# Patient Record
Sex: FEMALE | Race: WHITE | URBAN - METROPOLITAN AREA
[De-identification: names, ages, dates, MRNs, and addresses within clinical notes are randomized per-mention and may not be internally consistent; named-entity substitution may affect disease eponyms.]

---

## 2019-03-04 ENCOUNTER — TELEPHONE (OUTPATIENT)
Dept: FAMILY MEDICINE CLINIC | Age: 34
End: 2019-03-04

## 2019-03-04 NOTE — TELEPHONE ENCOUNTER
Patient called the office to request a refund of her deposit for an immigration physical. Patient was advised a message will be sent to the manager. Patient verbalized understanding and had no further questions.

## 2019-03-05 ENCOUNTER — OFFICE VISIT (OUTPATIENT)
Dept: INTERNAL MEDICINE CLINIC | Age: 34
End: 2019-03-05

## 2019-03-05 VITALS
HEIGHT: 63 IN | RESPIRATION RATE: 18 BRPM | OXYGEN SATURATION: 98 % | DIASTOLIC BLOOD PRESSURE: 86 MMHG | HEART RATE: 67 BPM | BODY MASS INDEX: 23.57 KG/M2 | TEMPERATURE: 97.5 F | SYSTOLIC BLOOD PRESSURE: 125 MMHG | WEIGHT: 133 LBS

## 2019-03-05 DIAGNOSIS — Z02.89 HISTORY AND PHYSICAL EXAMINATION, IMMIGRATION: Primary | ICD-10-CM

## 2019-03-05 RX ORDER — BISMUTH SUBSALICYLATE 262 MG
1 TABLET,CHEWABLE ORAL DAILY
COMMUNITY

## 2019-03-05 NOTE — PROGRESS NOTES
Immigration Physical  History:   Moraima Mancia is a 29 y.o. female presenting today for an initial visit for immigration physical.      1.  TB history/symptoms:    Patient denies history of active TB and denies exposure to anyone with active TB   History positive TST (PPD) or positive quantiferon?  no    2. Syphilis/gonorrhea/STD exposure/history/symptoms:  Patient denies exposure to STDs including syphilis and gonorrhea. 3.  Leprosy exposure/history/symptoms:  Patient denies anesthetic, hypo or hyperpigmented skin patches, sensory loss in fingers and toes, painless wounds. she also denies history of or exposure to leprosy as well as family history of skin lesions or leprosy. 4.  Mental disorders:  Patient denies depression, bipolar disorder, or schizophrenia. she denies suicidal or homicidal ideations or other harmful behavior. 5.  Substance use disorders:  Patient denies current or recent illicit use of controlled substances and street drugs. she denies use of alcohol that is causing harmful behavior. 6.  Vaccine history:  Patient has brought vaccine record? Yes. Patient has history of chickenpox or has had the chickenpox vaccine? Has varicella hx    Review of Systems   Constitutional: Negative. Negative for chills, fever, malaise/fatigue and weight loss. HENT: Negative. Eyes: Negative. Respiratory: Negative. Negative for cough, hemoptysis, sputum production and shortness of breath. Cardiovascular: Negative. Negative for chest pain. Gastrointestinal: Negative. Negative for abdominal pain. Genitourinary: Negative. Negative for hematuria. Denies genital sores, ulcers, discharge   Skin: Negative for rash. Neurological: Negative. Psychiatric/Behavioral: Negative. Negative for depression, hallucinations, substance abuse and suicidal ideas.         Denies h/o schizophrenia, bipolar d/o       Past Medical History:   Diagnosis Date    Arrhythmia        Past Surgical History:   Procedure Laterality Date    HX GYN         Social History     Socioeconomic History    Marital status:      Spouse name: Not on file    Number of children: Not on file    Years of education: Not on file    Highest education level: Not on file   Social Needs    Financial resource strain: Not on file    Food insecurity - worry: Not on file    Food insecurity - inability: Not on file    Transportation needs - medical: Not on file   [employer] needs - non-medical: Not on file   Occupational History    Not on file   Tobacco Use    Smoking status: Former Smoker    Smokeless tobacco: Never Used   Substance and Sexual Activity    Alcohol use: Not on file    Drug use: No    Sexual activity: Yes     Partners: Male     Birth control/protection: None   Other Topics Concern    Not on file   Social History Narrative    Not on file       Family History   Problem Relation Age of Onset    No Known Problems Mother     No Known Problems Father        Current Outpatient Medications on File Prior to Visit   Medication Sig Dispense Refill    multivitamin (ONE A DAY) tablet Take 1 Tab by mouth daily. No current facility-administered medications on file prior to visit. No Known Allergies    Objective:   VS:    Visit Vitals  /86 (BP 1 Location: Left arm, BP Patient Position: Sitting)   Pulse 67   Temp 97.5 °F (36.4 °C) (Oral)   Resp 18   Ht 5' 2.5\" (1.588 m)   Wt 133 lb (60.3 kg)   SpO2 98%   BMI 23.94 kg/m²     Physical Exam   Constitutional: She is oriented to person, place, and time. She appears well-developed and well-nourished. No distress. HENT:   Head: Normocephalic and atraumatic. Right Ear: External ear normal.   Left Ear: External ear normal.   Nose: Nose normal.   Mouth/Throat: Oropharynx is clear and moist.   Eyes: EOM are normal. Pupils are equal, round, and reactive to light. Neck: Normal range of motion. Neck supple. Carotid bruit is not present.  No tracheal deviation present. Cardiovascular: Normal rate, regular rhythm, normal heart sounds and intact distal pulses. Exam reveals no gallop and no friction rub. No murmur heard. Pulmonary/Chest: Effort normal and breath sounds normal. She has no wheezes. She has no rales. Abdominal: Soft. Bowel sounds are normal. She exhibits no distension. There is no tenderness. Musculoskeletal: She exhibits no edema or tenderness. Lymphadenopathy:     She has no cervical adenopathy. Neurological: She is alert and oriented to person, place, and time. Skin: Skin is warm and dry. Psychiatric: She has a normal mood and affect. Her behavior is normal.   Nursing note and vitals reviewed. Assessment/ Plan:     Diagnoses and all orders for this visit:    1. History and physical examination, immigration  -     QUANTIFERON-TB GOLD PLUS; Future  -     RPR; Future    She had neg GC ROBERT in Jan so not repeating that. Got a copy of result. She needs MMR booster and flu shot. Will bring back next week    I have discussed the diagnosis with the patient and the intended plan as seen in the above orders. The patient verbalized understanding and agrees with the plan. Follow-up Disposition:  Return for we will call you when lab results are back.     Rachel Mao MD

## 2019-03-05 NOTE — PROGRESS NOTES
Chief Complaint   Patient presents with    Physical     Immigration     1. Have you been to the ER, urgent care clinic since your last visit? Hospitalized since your last visit? No    2. Have you seen or consulted any other health care providers outside of the [address] [place] [address] since your last visit? Include any pap smears or colon screening.  No   3 most recent PHQ Screens 3/5/2019   Little interest or pleasure in doing things Not at all   Feeling down, depressed, irritable, or hopeless Not at all   Total Score PHQ 2 0

## 2019-03-09 LAB
GAMMA INTERFERON BACKGROUND BLD IA-ACNC: 0.04 IU/ML
M TB IFN-G BLD-IMP: NEGATIVE
M TB IFN-G CD4+ BCKGRND COR BLD-ACNC: 0.03 IU/ML
MITOGEN IGNF BLD-ACNC: >10 IU/ML
QUANTIFERON INCUBATION, QF1T: NORMAL
QUANTIFERON TB2 AG: 0.03 IU/ML
RPR SER QL: NON REACTIVE
SERVICE CMNT-IMP: NORMAL

## 2019-05-21 ENCOUNTER — OFFICE VISIT (OUTPATIENT)
Dept: INTERNAL MEDICINE CLINIC | Age: 34
End: 2019-05-21

## 2019-05-21 VITALS
OXYGEN SATURATION: 97 % | RESPIRATION RATE: 12 BRPM | HEIGHT: 63 IN | HEART RATE: 75 BPM | DIASTOLIC BLOOD PRESSURE: 78 MMHG | SYSTOLIC BLOOD PRESSURE: 113 MMHG | BODY MASS INDEX: 22.32 KG/M2 | TEMPERATURE: 98 F | WEIGHT: 126 LBS

## 2019-05-21 DIAGNOSIS — Z02.89 HISTORY AND PHYSICAL EXAMINATION, IMMIGRATION: Primary | ICD-10-CM

## 2019-05-21 NOTE — PROGRESS NOTES
Immigration Physical  History:   Fozia Gamez is a 29 y.o. female presenting today for an initial visit for immigration physical.      1.  TB history/symptoms:    Patient denies history of active TB and denies exposure to anyone with active TB   History positive TST (PPD) or positive quantiferon? No     2. Syphilis/gonorrhea/STD exposure/history/symptoms:  Patient denies exposure to STDs including syphilis and gonorrhea. 3.  Leprosy exposure/history/symptoms:  Patient denies anesthetic, hypo or hyperpigmented skin patches, sensory loss in fingers and toes, painless wounds. she also denies history of or exposure to leprosy as well as family history of skin lesions or leprosy. 4.  Mental disorders:  Patient denies depression, bipolar disorder, or schizophrenia. she denies suicidal or homicidal ideations or other harmful behavior. 5.  Substance use disorders:  Patient denies current or recent illicit use of controlled substances and street drugs. she denies use of alcohol that is causing harmful behavior. 6.  Vaccine history:  Patient has brought vaccine record? Patient has history of chickenpox or has had the chickenpox vaccine?     ROS    Past Medical History:   Diagnosis Date    Arrhythmia        Past Surgical History:   Procedure Laterality Date    HX GYN         Social History     Socioeconomic History    Marital status:      Spouse name: Not on file    Number of children: Not on file    Years of education: Not on file    Highest education level: Not on file   Occupational History    Not on file   Social Needs    Financial resource strain: Not on file    Food insecurity:     Worry: Not on file     Inability: Not on file    Transportation needs:     Medical: Not on file     Non-medical: Not on file   Tobacco Use    Smoking status: Former Smoker    Smokeless tobacco: Never Used   Substance and Sexual Activity    Alcohol use: Not on file    Drug use: No    Sexual activity: Yes     Partners: Male     Birth control/protection: None   Lifestyle    Physical activity:     Days per week: Not on file     Minutes per session: Not on file    Stress: Not on file   Relationships    Social connections:     Talks on phone: Not on file     Gets together: Not on file     Attends Hinduism service: Not on file     Active member of club or organization: Not on file     Attends meetings of clubs or organizations: Not on file     Relationship status: Not on file    Intimate partner violence:     Fear of current or ex partner: Not on file     Emotionally abused: Not on file     Physically abused: Not on file     Forced sexual activity: Not on file   Other Topics Concern    Not on file   Social History Narrative    Not on file       Family History   Problem Relation Age of Onset    No Known Problems Mother     No Known Problems Father        Current Outpatient Medications on File Prior to Visit   Medication Sig Dispense Refill    L. rhamnosus GG/inulin (CULTURELLE PROBIOTICS PO) Take  by mouth.  multivitamin (ONE A DAY) tablet Take 1 Tab by mouth daily. No current facility-administered medications on file prior to visit. No Known Allergies    Objective:   VS:    Visit Vitals  /78 (BP 1 Location: Left arm, BP Patient Position: Sitting)   Pulse 75   Temp 98 °F (36.7 °C)   Resp 12   Ht 5' 3\" (1.6 m)   Wt 126 lb (57.2 kg)   SpO2 97%   BMI 22.32 kg/m²     Physical Exam   Constitutional: She is oriented to person, place, and time. She appears well-developed and well-nourished. No distress. HENT:   Head: Normocephalic and atraumatic. Right Ear: External ear normal.   Left Ear: External ear normal.   Nose: Nose normal.   Mouth/Throat: Oropharynx is clear and moist.   Eyes: Pupils are equal, round, and reactive to light. EOM are normal.   Neck: Normal range of motion. Neck supple. Carotid bruit is not present. No tracheal deviation present.    Cardiovascular: Normal rate, regular rhythm, normal heart sounds and intact distal pulses. Exam reveals no gallop and no friction rub. No murmur heard. Pulmonary/Chest: Effort normal and breath sounds normal. She has no wheezes. She has no rales. Abdominal: Soft. Bowel sounds are normal. She exhibits no distension. There is no tenderness. Musculoskeletal: She exhibits no edema or tenderness. Lymphadenopathy:     She has no cervical adenopathy. Neurological: She is alert and oriented to person, place, and time. Skin: Skin is warm and dry. Psychiatric: She has a normal mood and affect. Her behavior is normal.   Nursing note and vitals reviewed. Assessment/ Plan:     Diagnoses and all orders for this visit:    1. History and physical examination, immigration    She was late for the new 60 day deadline in submitting her forms so has to have them redone. Since we did the labs 2 months ago, I used those labs to fill out this form so not redoing labs. Finished the paperwork and gave to her today. I have discussed the diagnosis with the patient and the intended plan as seen in the above orders. The patient verbalized understanding and agrees with the plan. Follow-up and Dispositions    · Return if symptoms worsen or fail to improve.          Edna Dimas MD

## 2019-05-21 NOTE — PROGRESS NOTES
Radha Weaver is a 29 y.o. female (: 1985) presenting to address:    Chief Complaint   Patient presents with    Physical     immigration PE       Medication list and allergies have been reviewed with Radha Weaver and updated as of today's date. I have gone over all Medical, Surgical and Social History with Radha Weaver and updated/added the information accordingly. 1. Have you been to the ER, Urgent Care or Hospitalized since your last visit? YES, [facility] for strep throat 5/10/19      2. Have you followed up with your PCP or any other Physicians since your procedure/ last office visit?    NO    VORB:   Orders Placed This Encounter    L. rhamnosus GG/inulin (CULTURELLE PROBIOTICS PO)   Hemant Walker MD/Richy Dumas

## 2019-06-21 ENCOUNTER — TELEPHONE (OUTPATIENT)
Dept: INTERNAL MEDICINE CLINIC | Age: 34
End: 2019-06-21

## 2019-06-21 NOTE — TELEPHONE ENCOUNTER
Called left msg for pt to callback. Please inform pt the outstanding bill for Imm CPE has been resolved and she is now at a $0 balance. She can call the billing department to request an itemized bill for her records.